# Patient Record
Sex: FEMALE | Race: WHITE | NOT HISPANIC OR LATINO | Employment: OTHER | URBAN - NONMETROPOLITAN AREA
[De-identification: names, ages, dates, MRNs, and addresses within clinical notes are randomized per-mention and may not be internally consistent; named-entity substitution may affect disease eponyms.]

---

## 2024-09-21 ENCOUNTER — HOSPITAL ENCOUNTER (EMERGENCY)
Facility: HOSPITAL | Age: 89
Discharge: HOME | End: 2024-09-21
Attending: EMERGENCY MEDICINE
Payer: COMMERCIAL

## 2024-09-21 VITALS
HEIGHT: 64 IN | WEIGHT: 183.2 LBS | DIASTOLIC BLOOD PRESSURE: 102 MMHG | TEMPERATURE: 97.7 F | SYSTOLIC BLOOD PRESSURE: 170 MMHG | BODY MASS INDEX: 31.28 KG/M2 | HEART RATE: 88 BPM | OXYGEN SATURATION: 97 % | RESPIRATION RATE: 16 BRPM

## 2024-09-21 DIAGNOSIS — B37.2 CANDIDAL INTERTRIGO: Primary | ICD-10-CM

## 2024-09-21 PROCEDURE — 99284 EMERGENCY DEPT VISIT MOD MDM: CPT | Performed by: EMERGENCY MEDICINE

## 2024-09-21 PROCEDURE — 99282 EMERGENCY DEPT VISIT SF MDM: CPT

## 2024-09-21 RX ORDER — NYSTATIN 100000 [USP'U]/G
POWDER TOPICAL 3 TIMES DAILY
Qty: 60 G | Refills: 3 | Status: SHIPPED | OUTPATIENT
Start: 2024-09-21

## 2024-09-21 RX ORDER — NYSTATIN 100000 [USP'U]/G
POWDER TOPICAL ONCE
Status: COMPLETED | OUTPATIENT
Start: 2024-09-21 | End: 2024-09-21

## 2024-09-21 RX ADMIN — NYSTATIN 1 APPLICATION: 100000 POWDER TOPICAL at 20:48

## 2024-09-21 RX ADMIN — NYSTATIN 1 APPLICATION: 100000 POWDER TOPICAL at 20:29

## 2024-09-22 NOTE — ED PROVIDER NOTES
1. Candidal intertrigo      ED Disposition       ED Disposition   Discharge    Condition   Stable    Date/Time   Sat Sep 21, 2024  8:15 PM    Comment   Kimberli Elizabeth discharge to home/self care.                   Assessment & Plan       Medical Decision Making  Patient presents with a nonemergent appearing rash consistent with intertrigo likely candidal.  History and exam findings not consistent with dangerous etiologies of rash such as SJS/TN, or secondary dangerous causes such as petechial rashes from thrombocytopenia or rickettsial infections.  Rash does not appear urticarial with no signs of anaphylaxis either.  Plan at this time is to treat symptomatically with topical antifungal powder, instruct to follow-up with PCP or dermatology as needed.    Problems Addressed:  Candidal intertrigo: chronic illness or injury with exacerbation, progression, or side effects of treatment    Risk  OTC drugs.  Prescription drug management.                     Medications   nystatin (MYCOSTATIN) powder (has no administration in time range)   nystatin (MYCOSTATIN) powder (1 Application Topical Given 9/21/24 2029)       History of Present Illness       Patient is an 89-year-old female presenting to the emergency department with her daughter complaining of rash in the skin under her abdomen, she reports it is red, excoriated, painful, and there is odor to it, she has a history of previous candidal infection, was told to try to keep the area dry, has been prescribed medication for previously, however is currently camping in the area and does not have any topical medication to put on it        Review of Systems   Constitutional: Negative.    HENT: Negative.     Eyes: Negative.    Respiratory: Negative.     Cardiovascular: Negative.    Gastrointestinal: Negative.    Endocrine: Negative.    Genitourinary: Negative.    Musculoskeletal: Negative.    Skin:  Positive for rash.   Allergic/Immunologic: Negative.    Neurological:  Negative.    Hematological: Negative.    Psychiatric/Behavioral: Negative.             Objective     ED Triage Vitals [09/21/24 2009]   Temperature Pulse Blood Pressure Respirations SpO2 Patient Position - Orthostatic VS   97.7 °F (36.5 °C) 88 (!) 170/102 16 97 % Sitting      Temp Source Heart Rate Source BP Location FiO2 (%) Pain Score    Temporal Monitor Left arm -- No Pain        Physical Exam  Constitutional:       Appearance: She is well-developed.   HENT:      Head: Normocephalic and atraumatic.   Eyes:      Conjunctiva/sclera: Conjunctivae normal.      Pupils: Pupils are equal, round, and reactive to light.   Cardiovascular:      Rate and Rhythm: Normal rate.   Pulmonary:      Effort: Pulmonary effort is normal.   Abdominal:      Palpations: Abdomen is soft.   Musculoskeletal:         General: Normal range of motion.      Cervical back: Normal range of motion and neck supple.   Skin:     General: Skin is warm and dry.             Comments: Erythematous excoriated moist odorous skin in the pannus and inguinal creases   Neurological:      Mental Status: She is alert and oriented to person, place, and time.         Labs Reviewed - No data to display  No orders to display       Procedures    ED Medication and Procedure Management   None     Patient's Medications   Discharge Prescriptions    NYSTATIN (MYCOSTATIN) POWDER    Apply topically 3 (three) times a day       Start Date: 9/21/2024 End Date: --       Order Dose: --       Quantity: 60 g    Refills: 3     No discharge procedures on file.     Vaishali Lord DO  09/21/24 2032